# Patient Record
Sex: FEMALE | Race: WHITE | NOT HISPANIC OR LATINO | URBAN - METROPOLITAN AREA
[De-identification: names, ages, dates, MRNs, and addresses within clinical notes are randomized per-mention and may not be internally consistent; named-entity substitution may affect disease eponyms.]

---

## 2022-05-17 ENCOUNTER — OFFICE (OUTPATIENT)
Dept: URBAN - METROPOLITAN AREA CLINIC 19 | Facility: CLINIC | Age: 77
End: 2022-05-17

## 2022-05-17 VITALS
WEIGHT: 113 LBS | HEART RATE: 61 BPM | DIASTOLIC BLOOD PRESSURE: 72 MMHG | SYSTOLIC BLOOD PRESSURE: 152 MMHG | OXYGEN SATURATION: 99 % | HEIGHT: 63 IN

## 2022-05-17 DIAGNOSIS — Z12.11 ENCOUNTER FOR SCREENING FOR MALIGNANT NEOPLASM OF COLON: ICD-10-CM

## 2022-05-17 RX ORDER — SODIUM PICOSULFATE, MAGNESIUM OXIDE, AND ANHYDROUS CITRIC ACID 10; 3.5; 12 MG/160ML; G/160ML; G/160ML
LIQUID ORAL
Qty: 320 | Refills: 0 | Status: COMPLETED
Start: 2022-05-17 | End: 2022-06-03

## 2022-05-17 NOTE — SERVICENOTES
The patient's assessment was reviewed with Dr. Levine and a collaborative plan of care was established.

## 2022-05-17 NOTE — SERVICEHPINOTES
76-year-old  white female here with her  from [place], [place] to discuss her screening colonoscopy.  She denies any GI symptoms at this time, including reflux, heartburn, nausea, vomiting, dysphagia, abdominal pain, change in bowel habit or overt GI bleeding ( on Plavix ).  She does have a history of coronary artery disease status post stent placement.  She is on Plavix which is managed by her cardiologist, Fito Coreas MD.  Her last colonoscopy 1/9/12 found hemorrhoids, but was otherwise unremarkable.  She was last here for EGD 4/23/14 to evaluate epigastric pain found a hiatal hernia and gastritis.  Biopsies were negative for H pylori.  CT abdomen/ pelvis 4/23/14 was largely unremarkable.  Routine blood work 4/8/14 was also unremarkable.  Family history is negative for colon neoplasm.

## 2022-06-03 ENCOUNTER — AMBULATORY SURGICAL CENTER (OUTPATIENT)
Dept: URBAN - METROPOLITAN AREA SURGERY 3 | Facility: SURGERY | Age: 77
End: 2022-06-03
Payer: MEDICARE

## 2022-06-03 VITALS
RESPIRATION RATE: 19 BRPM | SYSTOLIC BLOOD PRESSURE: 110 MMHG | OXYGEN SATURATION: 96 % | SYSTOLIC BLOOD PRESSURE: 124 MMHG | OXYGEN SATURATION: 93 % | SYSTOLIC BLOOD PRESSURE: 124 MMHG | DIASTOLIC BLOOD PRESSURE: 59 MMHG | TEMPERATURE: 97 F | HEART RATE: 66 BPM | TEMPERATURE: 98.3 F | TEMPERATURE: 98.3 F | SYSTOLIC BLOOD PRESSURE: 147 MMHG | RESPIRATION RATE: 14 BRPM | DIASTOLIC BLOOD PRESSURE: 59 MMHG | HEIGHT: 63 IN | HEART RATE: 64 BPM | DIASTOLIC BLOOD PRESSURE: 51 MMHG | SYSTOLIC BLOOD PRESSURE: 147 MMHG | HEIGHT: 63 IN | WEIGHT: 110 LBS | SYSTOLIC BLOOD PRESSURE: 85 MMHG | HEIGHT: 63 IN | HEART RATE: 63 BPM | SYSTOLIC BLOOD PRESSURE: 147 MMHG | HEART RATE: 69 BPM | OXYGEN SATURATION: 95 % | HEART RATE: 64 BPM | RESPIRATION RATE: 14 BRPM | SYSTOLIC BLOOD PRESSURE: 124 MMHG | DIASTOLIC BLOOD PRESSURE: 61 MMHG | OXYGEN SATURATION: 95 % | RESPIRATION RATE: 17 BRPM | DIASTOLIC BLOOD PRESSURE: 85 MMHG | HEART RATE: 63 BPM | HEART RATE: 67 BPM | DIASTOLIC BLOOD PRESSURE: 61 MMHG | HEART RATE: 69 BPM | SYSTOLIC BLOOD PRESSURE: 110 MMHG | HEART RATE: 66 BPM | HEART RATE: 63 BPM | TEMPERATURE: 97 F | DIASTOLIC BLOOD PRESSURE: 85 MMHG | RESPIRATION RATE: 18 BRPM | DIASTOLIC BLOOD PRESSURE: 51 MMHG | RESPIRATION RATE: 17 BRPM | HEART RATE: 67 BPM | DIASTOLIC BLOOD PRESSURE: 51 MMHG | RESPIRATION RATE: 18 BRPM | RESPIRATION RATE: 19 BRPM | TEMPERATURE: 98.3 F | DIASTOLIC BLOOD PRESSURE: 85 MMHG | OXYGEN SATURATION: 93 % | SYSTOLIC BLOOD PRESSURE: 85 MMHG | HEART RATE: 67 BPM | SYSTOLIC BLOOD PRESSURE: 85 MMHG | DIASTOLIC BLOOD PRESSURE: 61 MMHG | TEMPERATURE: 97 F | OXYGEN SATURATION: 95 % | DIASTOLIC BLOOD PRESSURE: 59 MMHG | OXYGEN SATURATION: 96 % | SYSTOLIC BLOOD PRESSURE: 110 MMHG | RESPIRATION RATE: 14 BRPM | DIASTOLIC BLOOD PRESSURE: 67 MMHG | WEIGHT: 110 LBS | HEART RATE: 64 BPM | RESPIRATION RATE: 17 BRPM | RESPIRATION RATE: 18 BRPM | OXYGEN SATURATION: 100 % | HEART RATE: 66 BPM | OXYGEN SATURATION: 100 % | HEART RATE: 69 BPM | DIASTOLIC BLOOD PRESSURE: 67 MMHG | DIASTOLIC BLOOD PRESSURE: 67 MMHG | SYSTOLIC BLOOD PRESSURE: 104 MMHG | RESPIRATION RATE: 19 BRPM | OXYGEN SATURATION: 100 % | SYSTOLIC BLOOD PRESSURE: 104 MMHG | OXYGEN SATURATION: 96 % | WEIGHT: 110 LBS | OXYGEN SATURATION: 93 % | SYSTOLIC BLOOD PRESSURE: 104 MMHG

## 2022-06-03 DIAGNOSIS — Z12.11 ENCOUNTER FOR SCREENING FOR MALIGNANT NEOPLASM OF COLON: ICD-10-CM

## 2022-06-03 DIAGNOSIS — K57.30 DIVERTICULOSIS OF LARGE INTESTINE WITHOUT PERFORATION OR ABS: ICD-10-CM

## 2022-06-03 PROCEDURE — G0121 COLON CA SCRN NOT HI RSK IND: HCPCS | Performed by: INTERNAL MEDICINE

## 2022-06-03 PROCEDURE — G8918 PT W/O PREOP ORDER IV AB PRO: HCPCS | Performed by: INTERNAL MEDICINE
